# Patient Record
Sex: FEMALE | Race: BLACK OR AFRICAN AMERICAN | NOT HISPANIC OR LATINO | Employment: FULL TIME | URBAN - METROPOLITAN AREA
[De-identification: names, ages, dates, MRNs, and addresses within clinical notes are randomized per-mention and may not be internally consistent; named-entity substitution may affect disease eponyms.]

---

## 2019-06-04 ENCOUNTER — TELEPHONE (OUTPATIENT)
Dept: FAMILY MEDICINE | Facility: CLINIC | Age: 24
End: 2019-06-04

## 2019-06-04 NOTE — TELEPHONE ENCOUNTER
"Date of discharge: 06/03/2019  Facility of discharge: [facility]  Patient concerns about condition: Wants to discuss if she is on \"the right medication\" as she believes it may not be working  Patient concerns about medications: Concerns include seizures   Full med reconciliation will be completed at clinic visit.  Patient concerns about transitioning: No concerns at this time.  Clinic office visit appointment date: 06/10/2019  Patient reminded to bring all medications (prescription and over-the-counter) to clinic appointment: Yes    Using the date of discharge as day 1, the 30th day post discharge is 07/03/2019.      "

## 2020-02-14 ENCOUNTER — COMMUNICATION - HEALTHEAST (OUTPATIENT)
Dept: SCHEDULING | Facility: CLINIC | Age: 25
End: 2020-02-14

## 2021-06-06 NOTE — TELEPHONE ENCOUNTER
RN Assessment/Reason for Call:   Okay to leave Detailed Message  Anabelle calling in, inner thigh boil pain.  White head is coming, just busted, super red.    RN Action/Disposition:  Protocol recommends see DrReddy in 4 hrs.  Offered ER/UR.  Call back if worse symptoms  Discussed home care measures.  Agrees to plan.     Jeimy Garcia, RN    Care Connection Triage/med refill  2/14/2020  8:35 PM        Reason for Disposition    Red streak from area of infection    Protocols used: BOIL (SKIN ABSCESS)-A-AH

## 2021-06-16 PROBLEM — R56.9 SEIZURE (H): Status: ACTIVE | Noted: 2019-06-02

## 2021-06-16 PROBLEM — J96.01 ACUTE RESPIRATORY FAILURE WITH HYPOXIA AND HYPERCAPNIA (H): Status: ACTIVE | Noted: 2018-01-31

## 2021-06-16 PROBLEM — J96.02 ACUTE RESPIRATORY FAILURE WITH HYPOXIA AND HYPERCAPNIA (H): Status: ACTIVE | Noted: 2018-01-31

## 2021-06-16 PROBLEM — G40.909 RECURRENT SEIZURES (H): Status: ACTIVE | Noted: 2019-02-16

## 2023-09-24 ENCOUNTER — HOSPITAL ENCOUNTER (EMERGENCY)
Facility: HOSPITAL | Age: 28
Discharge: HOME | End: 2023-09-24
Attending: EMERGENCY MEDICINE | Admitting: EMERGENCY MEDICINE
Payer: COMMERCIAL

## 2023-09-24 VITALS
DIASTOLIC BLOOD PRESSURE: 64 MMHG | TEMPERATURE: 98.8 F | HEIGHT: 63 IN | SYSTOLIC BLOOD PRESSURE: 110 MMHG | RESPIRATION RATE: 16 BRPM | HEART RATE: 66 BPM | OXYGEN SATURATION: 100 % | WEIGHT: 193 LBS | BODY MASS INDEX: 34.2 KG/M2

## 2023-09-24 DIAGNOSIS — R51.9 ACUTE NONINTRACTABLE HEADACHE, UNSPECIFIED HEADACHE TYPE: ICD-10-CM

## 2023-09-24 DIAGNOSIS — Z76.0 ENCOUNTER FOR MEDICATION REFILL: ICD-10-CM

## 2023-09-24 DIAGNOSIS — G40.909 SEIZURE DISORDER (H): ICD-10-CM

## 2023-09-24 LAB
ANION GAP SERPL CALCULATED.3IONS-SCNC: 11 MMOL/L (ref 7–15)
BUN SERPL-MCNC: 9.1 MG/DL (ref 6–20)
CALCIUM SERPL-MCNC: 10.1 MG/DL (ref 8.6–10)
CHLORIDE SERPL-SCNC: 99 MMOL/L (ref 98–107)
CREAT SERPL-MCNC: 0.84 MG/DL (ref 0.51–0.95)
DEPRECATED HCO3 PLAS-SCNC: 24 MMOL/L (ref 22–29)
EGFRCR SERPLBLD CKD-EPI 2021: >90 ML/MIN/1.73M2
GLUCOSE SERPL-MCNC: 89 MG/DL (ref 70–99)
MAGNESIUM SERPL-MCNC: 1.9 MG/DL (ref 1.7–2.3)
POTASSIUM SERPL-SCNC: 4.4 MMOL/L (ref 3.4–5.3)
SODIUM SERPL-SCNC: 134 MMOL/L (ref 136–145)

## 2023-09-24 PROCEDURE — 83735 ASSAY OF MAGNESIUM: CPT | Performed by: EMERGENCY MEDICINE

## 2023-09-24 PROCEDURE — 80048 BASIC METABOLIC PNL TOTAL CA: CPT | Performed by: EMERGENCY MEDICINE

## 2023-09-24 PROCEDURE — 96375 TX/PRO/DX INJ NEW DRUG ADDON: CPT

## 2023-09-24 PROCEDURE — 250N000011 HC RX IP 250 OP 636: Mod: JZ | Performed by: EMERGENCY MEDICINE

## 2023-09-24 PROCEDURE — 96361 HYDRATE IV INFUSION ADD-ON: CPT

## 2023-09-24 PROCEDURE — 80175 DRUG SCREEN QUAN LAMOTRIGINE: CPT | Performed by: EMERGENCY MEDICINE

## 2023-09-24 PROCEDURE — 258N000003 HC RX IP 258 OP 636: Performed by: EMERGENCY MEDICINE

## 2023-09-24 PROCEDURE — 250N000013 HC RX MED GY IP 250 OP 250 PS 637: Performed by: EMERGENCY MEDICINE

## 2023-09-24 PROCEDURE — 36415 COLL VENOUS BLD VENIPUNCTURE: CPT | Performed by: EMERGENCY MEDICINE

## 2023-09-24 PROCEDURE — 96374 THER/PROPH/DIAG INJ IV PUSH: CPT

## 2023-09-24 PROCEDURE — 99284 EMERGENCY DEPT VISIT MOD MDM: CPT | Mod: 25

## 2023-09-24 RX ORDER — ONDANSETRON 2 MG/ML
4 INJECTION INTRAMUSCULAR; INTRAVENOUS ONCE
Status: COMPLETED | OUTPATIENT
Start: 2023-09-24 | End: 2023-09-24

## 2023-09-24 RX ORDER — LAMOTRIGINE 200 MG/1
TABLET ORAL
Qty: 244 TABLET | Refills: 0 | Status: SHIPPED | OUTPATIENT
Start: 2023-09-24 | End: 2023-11-25

## 2023-09-24 RX ORDER — DEXAMETHASONE SODIUM PHOSPHATE 10 MG/ML
6 INJECTION, SOLUTION INTRAMUSCULAR; INTRAVENOUS ONCE
Status: COMPLETED | OUTPATIENT
Start: 2023-09-24 | End: 2023-09-24

## 2023-09-24 RX ORDER — KETOROLAC TROMETHAMINE 15 MG/ML
15 INJECTION, SOLUTION INTRAMUSCULAR; INTRAVENOUS ONCE
Status: COMPLETED | OUTPATIENT
Start: 2023-09-24 | End: 2023-09-24

## 2023-09-24 RX ORDER — LAMOTRIGINE 200 MG/1
200 TABLET ORAL ONCE
Status: COMPLETED | OUTPATIENT
Start: 2023-09-24 | End: 2023-09-24

## 2023-09-24 RX ADMIN — DEXAMETHASONE SODIUM PHOSPHATE 6 MG: 10 INJECTION, SOLUTION INTRAMUSCULAR; INTRAVENOUS at 19:21

## 2023-09-24 RX ADMIN — KETOROLAC TROMETHAMINE 15 MG: 15 INJECTION INTRAMUSCULAR; INTRAVENOUS at 19:20

## 2023-09-24 RX ADMIN — ONDANSETRON 4 MG: 2 INJECTION INTRAMUSCULAR; INTRAVENOUS at 19:20

## 2023-09-24 RX ADMIN — LAMOTRIGINE 200 MG: 200 TABLET ORAL at 19:52

## 2023-09-24 RX ADMIN — SODIUM CHLORIDE 1000 ML: 9 INJECTION, SOLUTION INTRAVENOUS at 19:19

## 2023-09-24 ASSESSMENT — ACTIVITIES OF DAILY LIVING (ADL): ADLS_ACUITY_SCORE: 35

## 2023-09-24 NOTE — ED TRIAGE NOTES
"      Patient brought in by [facility] from home. Patient reports she has not been able to take her seizure medications since Friday due to an insurance issue. Patient reports she had a seizure on Saturday and feels like she may have another seizure. She describes her seizure on Saturday as \"I went in to a daze and when I came out I didn't remember anything.\" Her symptoms now include a headache, sensitivity to light and diaphoresis.   "

## 2023-09-24 NOTE — ED PROVIDER NOTES
EMERGENCY DEPARTMENT ENCOUNTER      NAME: Anabelle Walters  YOB: 1995  MRN: [id number]    FINAL IMPRESSION  1. Seizure disorder (H)    2. Encounter for medication refill    3. Acute nonintractable headache, unspecified headache type        MEDICAL DECISION MAKING   Pertinent Labs & Imaging studies reviewed. (See chart for details)    Anabelle Walters is a 28 year old female who presents for evaluation of seizures and in hopes of medication refill.  Records reviewed.  Patient has a history of seizure disorder and follows with [facility].  She is currently taking Lamictal 840 mg twice daily and clobazam 20 mg twice daily.  I reviewed recent notes.  She has not had any recent admissions.  Today, she presents with complaints of headache, nausea, and symptoms that often precedes seizures.  She reports that she had a seizure yesterday and was out of it for a period of time thereafter.  She notes that she has not had her Lamictal for the last 3 days due to insurance issues and she believes that this is why she has been having seizures.  She denies fever, chills, vomiting, fall/head trauma, alcohol consumption, drug use.  Remainder of history and exam, as below.     I considered a broad differential including but not limited to seizure, aura, migraine, electrolyte derangement, occult infection, medication noncompliance.  There are no external signs of trauma and/or focal neurologic findings on exam and I have low suspicion for SDH, SAH, CVA, mass or other acute intracranial process requiring imaging.  Discussed options for work-up and management with patient.  We have agreed on plan to check basic labs and discussed the case with [facility].  Will manage headache with IV fluids, Decadron, Toradol, and Zofran.    I spoke with Dr. Freeman who reviewed patient's records and medication list.  She recommended restarting the patient on lamotrigine immediate release at 200 mg x2 doses, 300  mg x 2 doses, then back to 400 mg twice daily daily.  Someone from their team will call her to arrange a follow-up appointment.  I updated the patient after this conversation and she felt comfortable with plan.    Patient tolerated lamotrigine here without difficulty.  Laboratory work-up was unremarkable.  She did not have any recurrent seizures and had resolution of headache.  She has been able to tolerate p.o. and ambulate without assistance.  We will send with a new prescription for lamotrigine and also encouraged her to continue taking clobazam as prescribed.    Strict return precautions and follow up recommendations were discussed and all questions were answered. Patient endorsed understanding and was in agreement with plan.        Medical Decision Making    History:  Supplemental history from: Documented in chart, if applicable  External Record(s) reviewed: Outpatient Record: [facility] on 5-    Work Up:  Chart documentation includes differential considered and any EKGs or imaging independently interpreted by provider, where specified.  In additional to work up documented, I considered the following work up: Documented in chart, if applicable.    External consultation:  Discussion of management with another provider: Pharmacist and Other: [facility]    Complicating factors:  Care impacted by chronic illness: Other: seizure history  Care affected by social determinants of health: N/A    Disposition considerations: Discharge. I prescribed additional prescription strength medication(s) as charted. See documentation for any additional details.          ED COURSE  6:23 PM I met the patient and performed my initial interview and exam.   7:04 PM I spoke to Dr. Freeman from [facility] regarding patient medication issues.  7:11 PM Rechecked and updated patient on plan of care and medication.   7:21 PM Spoke to pharmacy regarding patient medication.  8:09 PM Rechecked and updated patient on plan  of care. Discussed lab results.   8:22 PM The patient is feeling better and wishes to go home.      MEDICATIONS GIVEN IN THE ED  Medications   sodium chloride 0.9% BOLUS 1,000 mL (0 mLs Intravenous Stopped 9/24/23 2019)   dexAMETHasone PF (DECADRON) injection 6 mg (6 mg Intravenous $Given 9/24/23 1921)   ketorolac (TORADOL) injection 15 mg (15 mg Intravenous $Given 9/24/23 1920)   ondansetron (ZOFRAN) injection 4 mg (4 mg Intravenous $Given 9/24/23 1920)   lamoTRIgine (LaMICtal) tablet 200 mg (200 mg Oral $Given 9/24/23 1952)       NEW PRESCRIPTIONS STARTED AT TODAY'S VISIT  Discharge Medication List as of 9/24/2023  8:40 PM        START taking these medications    Details   lamoTRIgine (LAMICTAL) 200 MG tablet Take 1 tablet (200 mg) by mouth daily for 1 day, THEN 1.5 tablets (300 mg) 2 times daily for 1 day, THEN 2 tablets (400 mg) 2 times daily for 60 days., Disp-244 tablet, R-0, Local Print                =================================================================    Chief Complaint   Patient presents with    Seizures         HPI:    Patient information was obtained from: patient     Use of : N/A     Anabelle COHEN Romeo is a 28 year old female who presents via EMS for evaluation of seizures.    2 days ago, patient went to refill her medication for her seizures, which were not covered by her insurance. She went home without them and hasn't lamotrigine since. Saturday, she had a seizure which caused her to develop mild confusion and a residual headache, dizziness, and diaphoresis. She did not fall or hit her head. Today, she was attempting to get her medication to no avail, and felt persisting symptoms as well as photophobia.     She denies fever, chills, chest pain, abdominal pain, diarrhea, dysuria, and hematuria.    Per chart review, the patient presented to [facility] on 5- for evaluation of seizures. Patient had spontaneous seizures without adjustments of antiseizure  "medication. Based on the preference of her primary epileptologist, levetiracetam was discontinued. Clobazam was gradually uptitrated to 15 mg twice daily. Lamotrigine remained unchanged.       RELEVANT HISTORY, MEDICATIONS, & ALLERGIES   Past medical history, surgical history, family history, medications, and allergies reviewed and pertinent noted in HPI.    REVIEW OF SYSTEMS:  A complete review of systems was performed with pertinent positives and negatives noted in the HPI. All other systems negative.     PHYSICAL EXAM:    Vitals: /64   Pulse 66   Temp 98.8  F (37.1  C) (Oral)   Resp 16   Ht 1.6 m (5' 3\")   Wt 87.5 kg (193 lb)   LMP 08/28/2023   SpO2 100%   BMI 34.19 kg/m     General: Alert and interactive, comfortable appearing.  HENT: Atraumatic. Full AROM of neck. Conjunctiva clear.   Cardiovascular: Regular rate and rhythm.   Chest/Pulmonary: Normal work of breathing. Speaking in complete sentences. Lungs CTAB. No chest wall tenderness or deformities.  Abdomen: Soft, nondistended. Nontender without guarding or rebound.  Extremities: Normal AROM of all major joints.  Skin: Warm and dry. Normal skin color.   Neuro: Speech clear. CNs grossly intact. Moves all extremities spontaneously.   Psych: Normal affect/mood, cooperative, memory appropriate.    LAB  Labs Ordered and Resulted from Time of ED Arrival to Time of ED Departure   BASIC METABOLIC PANEL - Abnormal       Result Value    Sodium 134 (*)     Potassium 4.4      Chloride 99      Carbon Dioxide (CO2) 24      Anion Gap 11      Urea Nitrogen 9.1      Creatinine 0.84      Calcium 10.1 (*)     Glucose 89      GFR Estimate >90     MAGNESIUM - Normal    Magnesium 1.9     LAMOTRIGINE LEVEL         I, Matthew Beauchamp, am serving as a scribe to document services personally performed by Dr. Juana Masterson based on my observation and the provider's statements to me. I, Juana Masterson MD attest that Matthew Beauchamp is acting in a scribe capacity, has observed my " performance of the services and has documented them in accordance with my direction.    Juana Masterson M.D.  Emergency Medicine  [facility] EMERGENCY DEPARTMENT  [address] [place] [id number] [phone]  Dept: [phone]     Juana Masterson MD  09/25/23 0053

## 2023-09-25 NOTE — DISCHARGE INSTRUCTIONS
You were seen in the Emergency Department today for seizures and a refill of your medicine.    I am sending you with a prescription for immediately release lamotrigine.  You should take 200 mg of this tomorrow morning, 300 mg in the evening.  Then the next day, take 300 mg in the morning and 400 mg in the evening.  Then the day after that, you can resume your usual 400 mg twice a day.      You can use the discount prescription card I am sending you with.  I think the cheapest option will be [employer] or [email or web address] but you may also want to look online.     You should continue to take your other seizure medication as prescribed.      Please return to the ER if you experience recurrent seizures, falls, vomiting, inability to keep fluids or medications down, and/or for any other new or concerning symptoms, otherwise please follow up with your primary doctor and neurologist for recheck.     Below is some information you might find useful.     Thank you for choosing [facility]. It was a pleasure taking care of you today!  - Dr. Juana Masterson

## 2023-09-26 LAB — LAMOTRIGINE SERPL-MCNC: 4.5 UG/ML

## 2023-09-27 ENCOUNTER — PATIENT OUTREACH (OUTPATIENT)
Dept: CARE COORDINATION | Facility: CLINIC | Age: 28
End: 2023-09-27
Payer: COMMERCIAL

## 2023-09-27 NOTE — PROGRESS NOTES
Clinic Care Coordination Contact  Follow Up Progress Note      Assessment:  The pt was recently in the ED, I called to check up on the pt, and help the pt setup a ED follow up.The pt was at [facility] for seizers. I called the pt,but her phone was not available.    Care Gaps:    Health Maintenance Due   Topic Date Due    YEARLY PREVENTIVE VISIT  Never done    ADVANCE CARE PLANNING  Never done    HEPATITIS B IMMUNIZATION (1 of 3 - 3-dose series) Never done    COVID-19 Vaccine (1) Never done    HIV SCREENING  Never done    HEPATITIS C SCREENING  Never done    PHQ-2 (once per calendar year)  Never done    INFLUENZA VACCINE (1) Never done           Care Plans      Intervention/Education provided during outreach:               Plan:     Care Coordinator will follow up in

## 2023-09-28 ENCOUNTER — PATIENT OUTREACH (OUTPATIENT)
Dept: CARE COORDINATION | Facility: CLINIC | Age: 28
End: 2023-09-28
Payer: COMMERCIAL

## 2023-10-03 ENCOUNTER — PATIENT OUTREACH (OUTPATIENT)
Dept: CARE COORDINATION | Facility: CLINIC | Age: 28
End: 2023-10-03
Payer: COMMERCIAL

## 2023-10-03 NOTE — PROGRESS NOTES
Clinic Care Coordination Contact  Follow Up Progress Note      Assessment: The pt was recently in the ED, I called to check up on the pt, and help the pt setup a ED follow up.The pt was at [facility] for seizers. I called the pt,but her phone was not available.      Care Gaps:    Health Maintenance Due   Topic Date Due    YEARLY PREVENTIVE VISIT  Never done    ADVANCE CARE PLANNING  Never done    HEPATITIS B IMMUNIZATION (1 of 3 - 3-dose series) Never done    COVID-19 Vaccine (1) Never done    HIV SCREENING  Never done    HEPATITIS C SCREENING  Never done    PHQ-2 (once per calendar year)  Never done    INFLUENZA VACCINE (1) Never done           Care Plans      Intervention/Education provided during outreach:               Plan:     Care Coordinator will follow up in

## 2024-03-27 NOTE — PROGRESS NOTES
Clinic Care Coordination Contact  Follow Up Progress Note      Assessment: The pt was recently in the ED, I called to check up on the pt, and help the pt setup a ED follow up.The pt was at [facility] for seizers. I called the pt,but her phone was not available.     Care Gaps:    Health Maintenance Due   Topic Date Due    YEARLY PREVENTIVE VISIT  Never done    ADVANCE CARE PLANNING  Never done    HEPATITIS B IMMUNIZATION (1 of 3 - 3-dose series) Never done    COVID-19 Vaccine (1) Never done    HIV SCREENING  Never done    HEPATITIS C SCREENING  Never done    PHQ-2 (once per calendar year)  Never done    INFLUENZA VACCINE (1) Never done           Care Plans      Intervention/Education provided during outreach:               Plan:   Care Coordinator will follow up in   
No

## 2025-07-21 ENCOUNTER — APPOINTMENT (OUTPATIENT)
Dept: CT IMAGING | Facility: HOSPITAL | Age: 30
End: 2025-07-21
Attending: STUDENT IN AN ORGANIZED HEALTH CARE EDUCATION/TRAINING PROGRAM
Payer: COMMERCIAL

## 2025-07-21 ENCOUNTER — HOSPITAL ENCOUNTER (EMERGENCY)
Facility: HOSPITAL | Age: 30
Discharge: HOME | End: 2025-07-21
Attending: STUDENT IN AN ORGANIZED HEALTH CARE EDUCATION/TRAINING PROGRAM
Payer: COMMERCIAL

## 2025-07-21 VITALS
WEIGHT: 150.7 LBS | TEMPERATURE: 98.2 F | HEART RATE: 68 BPM | HEIGHT: 64 IN | SYSTOLIC BLOOD PRESSURE: 122 MMHG | BODY MASS INDEX: 25.73 KG/M2 | RESPIRATION RATE: 18 BRPM | OXYGEN SATURATION: 100 % | DIASTOLIC BLOOD PRESSURE: 81 MMHG

## 2025-07-21 DIAGNOSIS — M54.6 ACUTE THORACIC BACK PAIN, UNSPECIFIED BACK PAIN LATERALITY: ICD-10-CM
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
## 2025-07-21 PROCEDURE — 72128 CT CHEST SPINE W/O DYE: CPT

## 2025-07-21 PROCEDURE — 250N000009 HC RX 250: Performed by: STUDENT IN AN ORGANIZED HEALTH CARE EDUCATION/TRAINING PROGRAM

## 2025-07-21 PROCEDURE — 99283 EMERGENCY DEPT VISIT LOW MDM: CPT

## 2025-07-21 PROCEDURE — 99281 EMR DPT VST MAYX REQ PHY/QHP: CPT | Performed by: STUDENT IN AN ORGANIZED HEALTH CARE EDUCATION/TRAINING PROGRAM

## 2025-07-21 PROCEDURE — 99284 EMERGENCY DEPT VISIT MOD MDM: CPT | Mod: 25

## 2025-07-21 PROCEDURE — 250N000013 HC RX MED GY IP 250 OP 250 PS 637: Performed by: STUDENT IN AN ORGANIZED HEALTH CARE EDUCATION/TRAINING PROGRAM

## 2025-07-21 PROCEDURE — 72125 CT NECK SPINE W/O DYE: CPT

## 2025-07-21 RX ORDER — ACETAMINOPHEN 325 MG/1
650 TABLET ORAL ONCE
Status: COMPLETED | OUTPATIENT
Start: 2025-07-21 | End: 2025-07-21

## 2025-07-21 RX ORDER — GINSENG 100 MG
CAPSULE ORAL ONCE
Status: COMPLETED | OUTPATIENT
Start: 2025-07-21 | End: 2025-07-21

## 2025-07-21 RX ADMIN — BACITRACIN: 500 OINTMENT TOPICAL at 22:18

## 2025-07-21 RX ADMIN — ACETAMINOPHEN 650 MG: 325 TABLET, FILM COATED ORAL at 21:16

## 2025-07-21 ASSESSMENT — COLUMBIA-SUICIDE SEVERITY RATING SCALE - C-SSRS
6. HAVE YOU EVER DONE ANYTHING, STARTED TO DO ANYTHING, OR PREPARED TO DO ANYTHING TO END YOUR LIFE?: NO
1. IN THE PAST MONTH, HAVE YOU WISHED YOU WERE DEAD OR WISHED YOU COULD GO TO SLEEP AND NOT WAKE UP?: NO
2. HAVE YOU ACTUALLY HAD ANY THOUGHTS OF KILLING YOURSELF IN THE PAST MONTH?: NO

## 2025-07-21 ASSESSMENT — ACTIVITIES OF DAILY LIVING (ADL): ADLS_ACUITY_SCORE: 41
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
